# Patient Record
Sex: MALE | Race: WHITE
[De-identification: names, ages, dates, MRNs, and addresses within clinical notes are randomized per-mention and may not be internally consistent; named-entity substitution may affect disease eponyms.]

---

## 2017-11-13 ENCOUNTER — HOSPITAL ENCOUNTER (EMERGENCY)
Dept: HOSPITAL 62 - ER | Age: 42
Discharge: HOME | End: 2017-11-13
Payer: MEDICAID

## 2017-11-13 VITALS — DIASTOLIC BLOOD PRESSURE: 79 MMHG | SYSTOLIC BLOOD PRESSURE: 134 MMHG

## 2017-11-13 DIAGNOSIS — M54.5: ICD-10-CM

## 2017-11-13 DIAGNOSIS — S16.1XXA: Primary | ICD-10-CM
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
## 2017-11-13 DIAGNOSIS — M54.2: ICD-10-CM

## 2017-11-13 PROCEDURE — 99283 EMERGENCY DEPT VISIT LOW MDM: CPT

## 2017-11-13 NOTE — ER DOCUMENT REPORT
ED General





- General


Chief Complaint: Neck Problem


Stated Complaint: NECK PAIN


Time Seen by Provider: 11/13/17 04:47


Notes: 





Patient is a 42-year-old male who says tonight he slipped on an old mattress.  

He says after waking up from mattress he felt in the left side of his neck.  No 

weakness or numbness into the arms.  No pain down his back.  No pain in the 

midline of the neck.  Patient denies any actual trauma.  He has no other 

complaints at this time.  Patient's significant other also slept on the same 

mattress and now has low back pain.


TRAVEL OUTSIDE OF THE [place] IN LAST 30 DAYS: No





- Related Data


Allergies/Adverse Reactions: 


 





No Known Allergies Allergy (Verified 02/07/15 17:08)


 











Past Medical History





- Social History


Smoking Status: Never Smoker


Frequency of alcohol use: None


Drug Abuse: None


Family History: Reviewed & Not Pertinent


Patient has suicidal ideation: No


Patient has homicidal ideation: No


Neurological Medical History: Denies: Hx Seizures


Renal/ Medical History: Denies: Hx Peritoneal Dialysis


Musculoskeltal Medical History: Reports Hx Arthritis


Past Surgical History: Reports: Hx Orthopedic Surgery





- Immunizations


Immunizations up to date: Yes


Hx Diphtheria, Pertussis, Tetanus Vaccination: Yes





Review of Systems





- Review of Systems


Notes: 





My Normal Review Basic





REVIEW OF SYSTEMS:


CONSTITUTIONAL :  Denies fever,  chills, or sweats.  Denies recent illness.


GENITOURINARY:  Denies difficulty urinating, painful urination, burning, 

frequency, or blood in urine.


MUSCULOSKELETAL: Pain over left side of neck.


NEUROLOGICAL: Denies sensory or motor loss.


ALL OTHER SYSTEMS REVIEWED AND NEGATIVE.





Physical Exam





- Vital signs


Vitals: 


 











Temp Pulse Resp BP Pulse Ox


 


 98.0 F   90   18   134/79 H  95 


 


 11/13/17 04:34  11/13/17 04:34  11/13/17 04:34  11/13/17 04:34  11/13/17 04:34














- Notes


Notes: 





General Appearance: Well nourished, alert, cooperative, no acute distress, no 

obvious discomfort.


Vitals: reviewed, See vital signs table.


Head: no swelling or tenderness to the head


Eyes: PERRL, EOMI, Conjuctiva clear


Neck: Supple, no midline neck tenderness palpation.  Patient has pinpoint 

tenderness over the left cervical paraspinal musculature.  No bony tenderness.  

No step-offs or deformities.  No bruising or swelling.


Lungs: No wheezing, No rales, No rhonci, No accessory muscle use, good air 

exchange bilaterally.


Heart: Normal rate, Regular rythm, No murmur, no rub


Extremities: strength 5/5 in all extremities, good pulses in all extremities, 

no swelling or tenderness in the extremities, no edema.


Skin: warm, dry, appropriate color, no rash


Neuro: speech clear, oriented x 3, normal affect, responds appropriately to 

questions.  Distal sensation in extremities is intact.





Course





- Re-evaluation


Re-evalutation: 





11/13/17 05:14


Patient will be discharged home.  Appears to have muscle strain in his neck 

from sleeping on the old mattress.  I will place him on Motrin and Skelaxin.  I 

encouraged him follow-up closely with his primary care doctor this week for 

reevaluation.  I encouraged him return to ER if his worsening pain, weakness or 

numbness in his extremities, or if he feels unwell.  Patient agrees with plan 

will be discharged home.





Dictation of this chart was performed using voice recognition software; 

therefore, there may be some unintended grammatical errors.





- Vital Signs


Vital signs: 


 











Temp Pulse Resp BP Pulse Ox


 


 98.0 F   90   18   134/79 H  95 


 


 11/13/17 04:34  11/13/17 04:34  11/13/17 04:34  11/13/17 04:34  11/13/17 04:34














Discharge





- Discharge


Clinical Impression: 


Cervical strain


Qualifiers:


 Encounter type: initial encounter Qualified Code(s): S16.1XXA - Strain of 

muscle, fascia and tendon at neck level, initial encounter





Condition: Good


Disposition: HOME, SELF-CARE


Additional Instructions: 


Please take the medication as prescribed. Please apply warm compresses for 20 

minutes at a time to the left side of your neck. Please return to the ER if you 

have worsening pain,  weakness or numbness in your arms, fevers, or feel unwell.


Prescriptions: 


Ibuprofen [Motrin 600 Mg Tablet] 600 mg PO TID #15 tablet


Metaxalone [Skelaxin 800 mg Tablet] 800 mg PO ASDIR PRN #20 tablet


 PRN Reason:

## 2018-03-08 ENCOUNTER — HOSPITAL ENCOUNTER (EMERGENCY)
Dept: HOSPITAL 62 - ER | Age: 43
Discharge: HOME | End: 2018-03-08
Payer: COMMERCIAL

## 2018-03-08 VITALS — DIASTOLIC BLOOD PRESSURE: 62 MMHG | SYSTOLIC BLOOD PRESSURE: 109 MMHG
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
## 2018-03-08 DIAGNOSIS — F17.200: ICD-10-CM

## 2018-03-08 DIAGNOSIS — S63.501A: Primary | ICD-10-CM

## 2018-03-08 DIAGNOSIS — M25.531: ICD-10-CM
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
## 2018-03-08 DIAGNOSIS — I10: ICD-10-CM

## 2018-03-08 DIAGNOSIS — M79.641: ICD-10-CM

## 2018-03-08 PROCEDURE — L3908 WHO COCK-UP NONMOLDE PRE OTS: HCPCS

## 2018-03-08 PROCEDURE — 73130 X-RAY EXAM OF HAND: CPT

## 2018-03-08 PROCEDURE — 99283 EMERGENCY DEPT VISIT LOW MDM: CPT

## 2018-03-08 NOTE — ER DOCUMENT REPORT
HPI





- HPI


Patient complains to provider of: r wrist/hand pain


Onset: Yesterday


Onset/Duration: Gradual


Quality of pain: Achy


Pain Level: 4


Context: 





Patient states that he was punching a box in an attempt to break it down and 

developed right hand and wrist pain.


Associated Symptoms: Other - right hand/wrist pain


Exacerbated by: Movement


Relieved by: Denies


Similar symptoms previously: No


Recently seen / treated by doctor: No





- ROS


ROS below otherwise negative: Yes


Systems Reviewed and Negative: Yes All other systems reviewed and negative





- NEURO


Neurology: DENIES: Weakness





- MUSCULOSKELETAL


Musculoskeletal: REPORTS: Extremity pain.  DENIES: Swelling





- DERM


Skin Color: Normal


Skin Problems: None





Past Medical History





- General


Information source: Patient





- Social History


Smoking Status: Current Every Day Smoker


Smoking Education Provided: Yes


Frequency of alcohol use: None


Drug Abuse: None


Occupation: [employer]


Lives with: Family


Family History: Reviewed & Not Pertinent





- Past Medical History


Cardiac Medical History: Reports: Hx Hypertension


Neurological Medical History: Denies: Hx Seizures


Renal/ Medical History: Denies: Hx Peritoneal Dialysis


Musculoskeltal Medical History: Reports Hx Arthritis


Past Surgical History: Reports: Hx Orthopedic Surgery





- Immunizations


Immunizations up to date: Yes


Hx Diphtheria, Pertussis, Tetanus Vaccination: Yes





Vertical Provider Document





- CONSTITUTIONAL


Agree With Documented VS: Yes


Exam Limitations: No Limitations


General Appearance: WD/WN, No Apparent Distress





- INFECTION CONTROL


TRAVEL OUTSIDE OF THE [place] IN LAST 30 DAYS: No





- HEENT


HEENT: Atraumatic, Normocephalic





- NECK


Neck: Normal Inspection





- RESPIRATORY


Respiratory: No Respiratory Distress


O2 Sat by Pulse Oximetry: 97





- CARDIOVASCULAR


Pulses: Normal: Radial





- MUSCULOSKELETAL/EXTREMETIES


Musculoskeletal/Extremeties: MAEW, FROM, Tender - Right wrist tenderness over 

ulnar aspect, mild tenderness over right fourth and fifth metacarpal, no edema 

or deformity, No Edema.  negative: Eccymosis





- NEURO


Level of Consciousness: Awake, Alert, Appropriate


Motor/Sensory: No Motor Deficit





- DERM


Integumentary: Warm, Dry, No Rash





Course





- Vital Signs


Vital signs: 


 











Temp Pulse Resp BP Pulse Ox


 


 97.8 F   83   18   120/76   97 


 


 03/08/18 09:32  03/08/18 09:32  03/08/18 09:32  03/08/18 09:32  03/08/18 09:32














- Diagnostic Test


Radiology reviewed: Image reviewed, Reports reviewed





Procedures





- Immobilization


  ** Right Wrist


Pre-Proc Neuro Vasc Exam: Normal


Immobilizer type: Cock-up


Performed by: PCT


Post-Proc Neuro Vasc Exam: Normal


Alignment checked and good: Yes





Discharge





- Discharge


Clinical Impression: 


Right wrist sprain


Qualifiers:


 Encounter type: initial encounter Qualified Code(s): S63.501A - Unspecified 

sprain of right wrist, initial encounter





Condition: Stable


Disposition: HOME, SELF-CARE


Instructions:  Acetaminophen, Ice & Elevation ([facility]), Wrist Sprain ([facility]), 

Temporary Splint ([facility])


Additional Instructions: 


Return immediately for any new or worsening symptoms





Followup with your primary care provider, call tomorrow to make a followup 

appointment





Follow-up with orthopedic doctor for any continued pain or problems


Forms:  Smoking Cessation Education, Return to Work


Referrals: 


NICKI BAHENA MD [Primary Care Provider] - Follow up as needed


[facility] [[facility]] - Follow up as needed

## 2018-03-08 NOTE — RADIOLOGY REPORT (SQ)
EXAM DESCRIPTION:  HAND RIGHT 3 VIEWS



COMPLETED DATE/TIME:  3/8/2018 10:07 am



REASON FOR STUDY:  punched box, r hand/wrist pain



COMPARISON:  None.



EXAM PARAMETERS:  NUMBER OF VIEWS: Three views.

TECHNIQUE: AP, lateral and oblique  radiographic images acquired of the right hand.

LIMITATIONS: None.



FINDINGS:  MINERALIZATION: Normal.

BONES: No acute fracture or dislocation.  Degenerative changes at the 1st carpometacarpal joint with 
sclerosis and osteophytes and deformity of the trapezium.  No worrisome bone lesions.

JOINTS: No effusions.

SOFT TISSUES: No soft tissue swelling.  No foreign body.

OTHER: No other significant finding.



IMPRESSION:  CHRONIC DEGENERATIVE CHANGES AT THE BASE OF THE THUMB WITH DEFORMITY OF THE TRAPEZIUM. N
O RADIOGRAPHIC EVIDENCE OF ACUTE INJURY.



TECHNICAL DOCUMENTATION:  JOB ID:  [id number]

 2011 [employer]- All Rights Reserved



Reading location - IP/workstation name: [email or web address]

## 2020-06-28 ENCOUNTER — HOSPITAL ENCOUNTER (EMERGENCY)
Dept: HOSPITAL 62 - ER | Age: 45
Discharge: HOME | End: 2020-06-28
Payer: MEDICAID

## 2020-06-28 VITALS — SYSTOLIC BLOOD PRESSURE: 124 MMHG | DIASTOLIC BLOOD PRESSURE: 82 MMHG

## 2020-06-28 DIAGNOSIS — M79.641: ICD-10-CM

## 2020-06-28 DIAGNOSIS — F84.0: ICD-10-CM

## 2020-06-28 DIAGNOSIS — F17.200: ICD-10-CM

## 2020-06-28 DIAGNOSIS — L03.113: Primary | ICD-10-CM

## 2020-06-28 DIAGNOSIS — I10: ICD-10-CM

## 2020-06-28 DIAGNOSIS — M79.89: ICD-10-CM

## 2020-06-28 LAB
ADD MANUAL DIFF: NO
ALBUMIN SERPL-MCNC: 4.2 G/DL (ref 3.5–5)
ALP SERPL-CCNC: 49 U/L (ref 38–126)
ANION GAP SERPL CALC-SCNC: 9 MMOL/L (ref 5–19)
AST SERPL-CCNC: 20 U/L (ref 17–59)
BASOPHILS # BLD AUTO: 0 10^3/UL (ref 0–0.2)
BASOPHILS NFR BLD AUTO: 0.4 % (ref 0–2)
BILIRUB DIRECT SERPL-MCNC: 0 MG/DL (ref 0–0.4)
BILIRUB SERPL-MCNC: 0.6 MG/DL (ref 0.2–1.3)
BUN SERPL-MCNC: 12 MG/DL (ref 7–20)
CALCIUM: 9.3 MG/DL (ref 8.4–10.2)
CHLORIDE SERPL-SCNC: 104 MMOL/L (ref 98–107)
CO2 SERPL-SCNC: 24 MMOL/L (ref 22–30)
EOSINOPHIL # BLD AUTO: 0.1 10^3/UL (ref 0–0.6)
EOSINOPHIL NFR BLD AUTO: 1.2 % (ref 0–6)
ERYTHROCYTE [DISTWIDTH] IN BLOOD BY AUTOMATED COUNT: 12.8 % (ref 11.5–14)
GLUCOSE SERPL-MCNC: 106 MG/DL (ref 75–110)
HCT VFR BLD CALC: 46.1 % (ref 37.9–51)
HGB BLD-MCNC: 16.4 G/DL (ref 13.5–17)
LYMPHOCYTES # BLD AUTO: 1.7 10^3/UL (ref 0.5–4.7)
LYMPHOCYTES NFR BLD AUTO: 19.8 % (ref 13–45)
MCH RBC QN AUTO: 33.7 PG (ref 27–33.4)
MCHC RBC AUTO-ENTMCNC: 35.5 G/DL (ref 32–36)
MCV RBC AUTO: 95 FL (ref 80–97)
MONOCYTES # BLD AUTO: 0.8 10^3/UL (ref 0.1–1.4)
MONOCYTES NFR BLD AUTO: 9.5 % (ref 3–13)
NEUTROPHILS # BLD AUTO: 5.8 10^3/UL (ref 1.7–8.2)
NEUTS SEG NFR BLD AUTO: 69.1 % (ref 42–78)
PLATELET # BLD: 234 10^3/UL (ref 150–450)
POTASSIUM SERPL-SCNC: 4.1 MMOL/L (ref 3.6–5)
PROT SERPL-MCNC: 7.5 G/DL (ref 6.3–8.2)
RBC # BLD AUTO: 4.85 10^6/UL (ref 4.35–5.55)
TOTAL CELLS COUNTED % (AUTO): 100 %
WBC # BLD AUTO: 8.4 10^3/UL (ref 4–10.5)

## 2020-06-28 PROCEDURE — 80053 COMPREHEN METABOLIC PANEL: CPT

## 2020-06-28 PROCEDURE — 99284 EMERGENCY DEPT VISIT MOD MDM: CPT

## 2020-06-28 PROCEDURE — 85025 COMPLETE CBC W/AUTO DIFF WBC: CPT

## 2020-06-28 PROCEDURE — 36415 COLL VENOUS BLD VENIPUNCTURE: CPT

## 2020-06-28 PROCEDURE — 76882 US LMTD JT/FCL EVL NVASC XTR: CPT

## 2020-06-28 NOTE — ER DOCUMENT REPORT
ED Medical Screen (RME)





- General


Chief Complaint: Hand Pain


Stated Complaint: HAND PAIN


Time Seen by Provider: 06/28/20 11:09


Primary Care Provider: 


NICKI BAHENA MD [Primary Care Provider] - Follow up as needed


Notes: 





44-year-old male who is hard of hearing presents today with acute onset of right

hand pain starting this morning around 3 AM.  Denied any pain at that time.  

Swelling has gone down since this morning.  Wrist continues to be warm, tender 

and painful with movement.  He denies being on any medications at this time.  X-

ray shows no acute fracture, shows arthritis of the first carpometacarpal joint.

Patient denies any trauma or bites. No fevers, chills or additional symptoms 

reported. 


TRAVEL OUTSIDE OF THE [place] IN LAST 30 DAYS: No





- Related Data


Allergies/Adverse Reactions: 


                                        





No Known Allergies Allergy (Verified 06/28/20 10:06)


   











Past Medical History





- Social History


Chew tobacco use (# tins/day): No


Frequency of alcohol use: None


Drug Abuse: None





- Past Medical History


Cardiac Medical History: Reports: Hx Hypertension


Pulmonary Medical History: Reports: None


Neurological Medical History: Reports: None.  Denies: Hx Seizures


Endocrine Medical History: Reports: None


Renal/ Medical History: Reports: None.  Denies: Hx Peritoneal Dialysis


GI Medical History: Reports: None


Musculoskeltal Medical History: Reports Hx Arthritis


Past Surgical History: Reports: Hx Orthopedic Surgery





- Immunizations


Immunizations up to date: Yes


Hx Diphtheria, Pertussis, Tetanus Vaccination: Yes





Physical Exam





- Vital signs


Vitals: 


                                        











Temp Pulse Resp BP Pulse Ox


 


 97.8 F   101 H  16   138/83 H  96 


 


 06/28/20 09:27  06/28/20 09:27  06/28/20 09:27  06/28/20 09:27  06/28/20 09:27














- Notes


Notes: 





Patient sitting comfortably in chair in no acute distress.  Right hand is warm, 

swollen and tender along the dorsal surface. He has full range of motion of his 

fingers.  Pain with wrist flexion and extension.  Neurovascularly is intact.





Course





- Re-evaluation


Re-evalutation: 





06/28/20 11:31


I have greeted and performed a rapid initial assessment of this patient. A 

comprehesive ED assessment and evaluation of this patient, analysis of test 

results and completion of the medical decision-making process will be conducted 

by additional ED providers.





- Vital Signs


Vital signs: 


                                        











Temp Pulse Resp BP Pulse Ox


 


 97.8 F   101 H  16   138/83 H  96 


 


 06/28/20 09:27  06/28/20 09:27  06/28/20 09:27  06/28/20 09:27  06/28/20 09:27














Doctor's Discharge





- Discharge


Referrals: 


NICKI BAHENA MD [Primary Care Provider] - Follow up as needed

## 2020-06-28 NOTE — ER DOCUMENT REPORT
ED General





- General


Chief Complaint: Hand Pain


Stated Complaint: HAND PAIN


Time Seen by Provider: 06/28/20 11:09


Primary Care Provider: 


NICKI BAHENA MD [NO LOCAL MD] - Follow up as needed


TRAVEL OUTSIDE OF THE [place] IN LAST 30 DAYS: No





- HPI


Notes: 





Chief complaint: Swelling dorsum right hand with no reported trauma





History of present illness: 44-year-old autistic male from [facility] facility 

presents for evaluation of pain and swelling over dorsum of right hand.  No 

reported trauma.  No reported repetitive motion.  Caregiver says the swelling 

was much worse earlier this morning and is gradually gotten better.  No fever, 

no chills, no vomiting.  No regular medications.  No known allergies.





- Related Data


Allergies/Adverse Reactions: 


                                        





No Known Allergies Allergy (Verified 06/28/20 10:06)


   











Past Medical History





- General


Information source: Patient, POA - Power of 





- Social History


Smoking Status: Current Every Day Smoker


Chew tobacco use (# tins/day): No


Frequency of alcohol use: None


Drug Abuse: None


Family History: Reviewed & Not Pertinent





- Past Medical History


Cardiac Medical History: Reports: Hx Hypertension


Pulmonary Medical History: Reports: None


Neurological Medical History: Reports: None.  Denies: Hx Seizures


Endocrine Medical History: Reports: None


Renal/ Medical History: Reports: None.  Denies: Hx Peritoneal Dialysis


GI Medical History: Reports: None


Musculoskeletal Medical History: Reports Hx Arthritis


Psychiatric Medical History: Reports: Other - Autistic


Past Surgical History: Reports: Hx Orthopedic Surgery





- Immunizations


Immunizations up to date: Yes


Hx Diphtheria, Pertussis, Tetanus Vaccination: Yes





Review of Systems





- Review of Systems


Notes: 





Constitutional: Negative for fever.


HENT: Negative for sore throat.


Eyes: Negative for visual changes.


Cardiovascular: Negative for chest pain.


Respiratory: Negative for shortness of breath.


Gastrointestinal: Negative for abdominal pain, vomiting or diarrhea.


Genitourinary: Negative for dysuria.


Musculoskeletal: As per HPI.


Skin: Negative for rash.


Neurological: Negative for headaches, weakness or numbness.





10 point ROS negative except as marked above and in HPI.








Physical Exam





- Vital signs


Vitals: 


                                        











Temp Pulse Resp BP Pulse Ox


 


 97.8 F   101 H  16   138/83 H  96 


 


 06/28/20 09:27  06/28/20 09:27  06/28/20 09:27  06/28/20 09:27 06/28/20 09:27














- Notes


Notes: 











GENERAL: Well-developed well-nourished male approximately stated age appearing 

in no acute distress.





SKIN: Good turgor no rashes.





HEAD: Normocephalic atraumatic.





EYES: PERRLA.  EOMI.  Conjunctivae and sclerae clear.





EARS: CANALS AND TMS CLEAR.





NOSE: CLEAR.





MOUTH: Moist mucosa.  Good dentition.  No stridor or edema.  No drooling.





NECK: Supple.  No masses or thyromegaly.  No adenopathy.  Carotids 2+ without 

bruits.  No JVD.





BACK: Symmetrical without tenderness.





CHEST: Respirations unlabored.  Breath sounds clear and symmetrical.





HEART: Regular rhythm.  No murmur gallop or rub.





ABDOMEN: Soft nontender without masses, organomegaly or rebound.  Bowel sounds 

normally active.  No bruits.





GENITALIA: Deferred.





EXTREMITIES: Mild soft tissue edema over the dorsum of the right hand with 

slight warmth and mild tenderness.  Patient appears to have 2 small insect bites

over the dorsal aspect of the proximal phalanx of the index finger.  There is 

also some scaling of the palmar surface of the hand.  There is no redness or 

tenderness on the palmar surface.  Ulnar and radial pulses are normal.  No calf 

tenderness.  Cap refill less than 1.5 seconds.  Dorsalis pedis and posterior 

tibial pulses 3+ and symmetrical.





NEUROLOGICAL: GCS 15.  Alert and oriented x3.  Normal gait.  Fluent speech.  

Cranial nerves II through XII intact.  Sensorimotor and cerebellar normal.  

Normal tone.





PSYCHIATRIC: Flat childlike affect.





Course





- Re-evaluation


Re-evalutation: 





06/28/20 13:02


CBC and conference of metabolic profile are normal.





Suspect this is due to an inflammatory reaction to an insect bite.  Appears 

stable for outpatient management.





Findings, clinical impression and plan of treatment have been discussed with 

patient/caregiver.  Understanding of current findings and recommendations has 

been acknowledged by them and there is agreement regarding disposition and 

follow-up.





- Vital Signs


Vital signs: 


                                        











Temp Pulse Resp BP Pulse Ox


 


 97.8 F   101 H  16   138/83 H  96 


 


 06/28/20 09:27  06/28/20 09:27  06/28/20 09:27  06/28/20 09:27 06/28/20 09:27














- Laboratory


Result Diagrams: 


                                 06/28/20 12:20





                                 06/28/20 12:20


Laboratory results interpreted by me: 


                                        











  06/28/20 06/28/20





  12:20 12:20


 


MCH  33.7 H 


 


Sodium   136.5 L














- Diagnostic Test


Radiology reviewed: Reports reviewed - Per radiologist: Plain films right hand 

demonstrate no fracture or dislocation.  There is mild soft tissue swelling.  

Venous Doppler right upper extremity shows no evidence of DVT.





Discharge





- Discharge


Clinical Impression: 


 Cellulitis of right hand





Condition: Stable


Disposition: HOME, SELF-CARE


Additional Instructions: 


Elevate right hand and apply ice packs intermittently.





Take prescribed medications.





Return here as needed for new or worsening symptoms:





Pain that is worsening or unimproved


Uncontrolled vomiting


High fever or shaking chills


Overall worsening





Follow-up with your primary care physician next 48 hours.


Prescriptions: 


Hydroxyzine HCl [Atarax 10 mg Tablet] 10 mg PO QID #20 tablet


Indomethacin 25 mg PO TID 5 Days #15 capsule


Cephalexin Monohydrate [Keflex 500 mg Capsule] 500 mg PO Q6H 10 Days #40 capsule


Referrals: 


NICKI BAHENA MD [NO LOCAL MD] - Follow up as needed

## 2020-06-28 NOTE — RADIOLOGY REPORT (SQ)
EXAM DESCRIPTION:  U/S EXTREMITY NONVASCULAR LTD



IMAGES COMPLETED DATE/TIME:  6/28/2020 12:08 pm



REASON FOR STUDY:  right dorsal wrist/hand pain



COMPARISON:  Right hand x-ray 6/28/2020



TECHNIQUE:  Static grayscale images acquired of the localized site of clinical concern and recorded o
n PACS. Additional selected color Doppler images recorded.

SITE OF CONCERN: Dorsum of the right hand.



LIMITATIONS:  None.



FINDINGS:  Ultrasound scanning was performed at the patient's area of concern at the dorsum of the ri
ght hand.  Ultrasound scanning was also performed of the dorsum of the left hand for comparison.

There is diffuse soft tissue edema.  There is a crescent-shaped hypoechoic area adjacent to an oval s
haped area of increased density, suggestive of possible extensor tenosynovitis.



IMPRESSION:  Diffuse soft tissue edema at the dorsum of the right hand with suggestion of possible ex
tensor tenosynovitis.  Evaluation with MRI as clinically warranted.



TECHNICAL DOCUMENTATION:  JOB ID:  [id number]

[id number]

 2011 [employer]- All Rights Reserved



Reading location - IP/workstation name: [email or web address]

## 2020-06-28 NOTE — RADIOLOGY REPORT (SQ)
EXAM DESCRIPTION:  HAND RIGHT 3 VIEWS



IMAGES COMPLETED DATE/TIME:  6/28/2020 10:14 am



REASON FOR STUDY:  bone tenderness, swelling



COMPARISON:  Right hand x-ray 3/8/2018



EXAM PARAMETERS:  NUMBER OF VIEWS: Three views.

TECHNIQUE: AP, lateral and oblique  radiographic images acquired of the right hand.

LIMITATIONS: None.



FINDINGS:  MINERALIZATION: Normal.

BONES: No acute fracture or dislocation.  Redemonstration of degenerative changes at the 1st carpomet
acarpal joint with osteophytosis and deformity of the trapezium.  Degenerative changes also noted at 
the radiocarpal joint.

SOFT TISSUES: There is diffuse soft tissue swelling at the forearm and the dorsum of the hand.  No ra
diopaque foreign body.



IMPRESSION:  No radiographic evidence for acute fracture at the right hand.  Base of the thumb arthri
tis.  Diffuse soft tissue swelling.



TECHNICAL DOCUMENTATION:  JOB ID:  [id number]

[id number]

 2011 [employer]- All Rights Reserved



Reading location - IP/workstation name: [email or web address]